# Patient Record
Sex: FEMALE | Race: WHITE
[De-identification: names, ages, dates, MRNs, and addresses within clinical notes are randomized per-mention and may not be internally consistent; named-entity substitution may affect disease eponyms.]

---

## 2017-07-03 ENCOUNTER — HOSPITAL ENCOUNTER (EMERGENCY)
Dept: HOSPITAL 62 - ER | Age: 27
LOS: 1 days | Discharge: HOME | End: 2017-07-04
Payer: SELF-PAY
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
## 2017-07-03 DIAGNOSIS — Z88.2: ICD-10-CM
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
## 2017-07-03 DIAGNOSIS — S59.902A: Primary | ICD-10-CM

## 2017-07-03 PROCEDURE — 99283 EMERGENCY DEPT VISIT LOW MDM: CPT

## 2017-07-03 NOTE — RADIOLOGY REPORT (SQ)
EXAM DESCRIPTION:  ELBOW LEFT OVER 2 VIEWS



COMPLETED DATE/TIME:  7/3/2017 11:26 pm



REASON FOR STUDY:  pain s/p injury



COMPARISON:  None.



NUMBER OF VIEWS:  Four views.



TECHNIQUE:  AP, lateral, and both oblique radiographic images acquired of the left elbow.



LIMITATIONS:  None.



FINDINGS:  MINERALIZATION: Normal.

BONES: No definite acute fracture or dislocation.  No worrisome bone lesions.

JOINT: There is prominence of both the anterior and posterior fat pads consistent with underlying elb
ow joint effusion.

SOFT TISSUES: No soft tissue swelling.  No foreign body.

OTHER: No other significant finding.



IMPRESSION:  Moderate elbow joint effusion.  No fracture identified on this study though in the setti
ng of trauma with elbow joint effusion radio occult fracture cannot be excluded most likely of the ra
dial head.



TECHNICAL DOCUMENTATION:  JOB ID:  [id number]

 2011 [employer]- All Rights Reserved

## 2017-07-04 VITALS — DIASTOLIC BLOOD PRESSURE: 70 MMHG | SYSTOLIC BLOOD PRESSURE: 133 MMHG

## 2017-07-04 NOTE — ER DOCUMENT REPORT
ED General





- General


Chief Complaint: L elbow injury


Stated Complaint: ELBOW PAIN


Time Seen by Provider: 07/04/17 00:13


Notes: 


Patient is a 26-year-old female without past medical history who presents with 

left elbow pain.  Does describe as a mild, aching, burning pain.  States that 

this pain started after she was roughhousing with some of her friend's children 

and believes she performed range of motion that caused some tendon injury or 

discomfort.  Denies any significant pain to the area unless she moves it or 

touches it.  No history of similar injury in the past.  She has not seen her 

primary care doctor regarding today's concerns.  Denies any additional injuries 

or concerns.


TRAVEL OUTSIDE OF THE [place] IN LAST 30 DAYS: No





- Related Data


Allergies/Adverse Reactions: 


 





Sulfa (Sulfonamide Antibiotics) Allergy (Verified 07/04/17 02:24)


 











Past Medical History





- General


Information source: Patient





- Social History


Smoking Status: Never Smoker


Frequency of alcohol use: None


Drug Abuse: None


Family History: Reviewed & Not Pertinent


Renal/ Medical History: Denies: Hx Peritoneal Dialysis





Review of Systems





- Review of Systems


Notes: 


Constitutional: Negative for fever.


Cardiovascular: Negative for chest pain.


Respiratory: Negative for shortness of breath.


Gastrointestinal: Negative for vomiting 


Musculoskeletal: Positive for left elbow pain


Skin: Negative for rash.


Neurological: Negative for weakness or numbness.





10 point ROS negative except as marked above and in HPI.





Physical Exam





- Vital signs


Vitals: 


 











Temp Pulse Resp BP Pulse Ox


 


 98.2 F   70   18   133/70 H  100 


 


 07/04/17 00:45  07/04/17 00:45  07/04/17 00:45  07/04/17 00:45  07/04/17 00:45











Interpretation: Hypertensive


Notes: 


PHYSICAL EXAMINATION:





GENERAL: Well-appearing, well-nourished and in no acute distress.





HEAD: Atraumatic, normocephalic.





EYES:  sclera anicteric, conjunctiva are normal.





ENT: Moist mucous membranes.





NECK: Normal range of motion





LUNGS: Normal work of breathing





HEART: 2+ radial pulses bilaterally





EXTREMITIES: Swelling to the left elbow.  No limited range of motion with full 

flexion and extension.  RMU sensory and motor distribution intact.





NEUROLOGICAL: No focal neurological deficits. Moves all extremities 

spontaneously and on command.





PSYCH: Normal mood, normal affect.





SKIN: Warm, Dry, normal turgor, no rashes or lesions noted.





Course





- Re-evaluation


Re-evalutation: 





07/04/17 00:35


Patient presents with swelling over the left elbow without any acute deformity 

or limited ROM. Focal swelling is present to the area both on exam and xray. 

Patient denies any direct blunt trauma to the area to suspect a radial head 

fracture. I have discussed this possibility with the patient based on xray read 

and she has verbalized the importance of follow-up and return precautions. 





- Vital Signs


Vital signs: 


 











Temp Pulse Resp BP Pulse Ox


 


 98.2 F   70   18   133/70 H  100 


 


 07/04/17 00:45  07/04/17 00:45  07/04/17 00:45  07/04/17 00:45  07/04/17 00:45














- Diagnostic Test


Radiology reviewed: Reports reviewed





Discharge





- Discharge


Clinical Impression: 


Injury of left elbow


Qualifiers:


 Encounter type: initial encounter Qualified Code(s): S59.902A - Unspecified 

injury of left elbow, initial encounter





Condition: Good


Disposition: HOME, SELF-CARE


Additional Instructions: 


Your x-ray does not show any acute fracture today.  You likely have a 

ligamentous strain.  You should continue to take anti-inflammatories such as 

ibuprofen 600 mg every 6 hours.  Continue to apply ice to the area is much your 

able.  Please follow-up with your primary care physician if you do not have 

improving your symptoms in the next 1-2 weeks.  Please return immediately if 

you develop weakness, numbness, spreading redness from the area, or any other 

symptoms that are concerning to you.

## 2018-09-08 ENCOUNTER — HOSPITAL ENCOUNTER (EMERGENCY)
Dept: HOSPITAL 62 - ER | Age: 28
Discharge: HOME | End: 2018-09-08
Payer: COMMERCIAL

## 2018-09-08 VITALS — SYSTOLIC BLOOD PRESSURE: 130 MMHG | DIASTOLIC BLOOD PRESSURE: 74 MMHG

## 2018-09-08 DIAGNOSIS — K02.9: Primary | ICD-10-CM

## 2018-09-08 DIAGNOSIS — K04.7: ICD-10-CM

## 2018-09-08 DIAGNOSIS — K08.89: ICD-10-CM

## 2018-09-08 DIAGNOSIS — K05.10: ICD-10-CM

## 2018-09-08 DIAGNOSIS — Z71.6: ICD-10-CM

## 2018-09-08 DIAGNOSIS — F17.210: ICD-10-CM

## 2018-09-08 DIAGNOSIS — Z88.2: ICD-10-CM

## 2018-09-08 PROCEDURE — 99282 EMERGENCY DEPT VISIT SF MDM: CPT

## 2018-09-08 PROCEDURE — 99406 BEHAV CHNG SMOKING 3-10 MIN: CPT

## 2018-09-08 NOTE — ER DOCUMENT REPORT
ED Oral Problem





- General


Chief Complaint: Mouth Problem


Stated Complaint: POSSIBLE ABSCESS


Time Seen by Provider: 09/08/18 18:45


Mode of Arrival: Ambulatory


Information source: Patient


Notes: 





28-year-old female presented ED for dental pain since Wednesday.  She states 

she has been solution and think trying to not have to come to the hospital.  

She states that the tooth has gotten worse and worse and is becoming more more 

painful.  Patient is alert and oriented respirations regular and unlabored 

speaking in full sentences walk with a even steady gait.


TRAVEL OUTSIDE OF THE [place] IN LAST 30 DAYS: No





- HPI


Patient complains to provider of: Toothache


Onset: Other - Tuesday or Wednesday


Onset: Gradual


Quality of pain: Sharp, Throbbing


Severity: Moderate


Pain Level: 4


Associated symptoms: Toothache


Worsened by: Cold


Relieved by: Nothing


Similar symptoms previously: Yes


Recently seen / treated by doctor/dentist: No





- Related Data


Allergies/Adverse Reactions: 


 





Sulfa (Sulfonamide Antibiotics) Allergy (Verified 07/04/17 02:24)


 











Past Medical History





- General


Information source: Patient





- Social History


Smoking Status: Current Every Day Smoker


Cigarette use (# per day): Yes - 1-2 cigarettes a day


Chew tobacco use (# tins/day): No


Smoking Education Provided: Yes - 4 Minutes


Frequency of alcohol use: Social


Drug Abuse: None


Occupation: Eye care


Lives with: Alone


Family History: Reviewed & Not Pertinent


Patient has suicidal ideation: No


Patient has homicidal ideation: No





- Past Medical History


Cardiac Medical History: Reports: None


Pulmonary Medical History: Reports: None


EENT Medical History: Reports: None


Neurological Medical History: Reports: None


Endocrine Medical History: Reports: None


Renal/ Medical History: Reports: Other - Colposcopy with biopsy


Malignancy Medical History: Reports: None


GI Medical History: Reports: None


Musculoskeletal Medical History: Reports None


Skin Medical History: Reports None


Psychiatric Medical History: Reports: None


Traumatic Medical History: Reports: None


Infectious Medical History: Reports: None


Past Surgical History: Reports: Hx Gynecologic Surgery - Possibly with biopsy





Review of Systems





- Review of Systems


Constitutional: No symptoms reported


EENT: Dental problem


Cardiovascular: No symptoms reported


Respiratory: No symptoms reported


Gastrointestinal: No symptoms reported


Genitourinary: No symptoms reported


Female Genitourinary: No symptoms reported


Musculoskeletal: No symptoms reported


Skin: No symptoms reported


Hematologic/Lymphatic: No symptoms reported


Neurological/Psychological: No symptoms reported





Physical Exam





- Vital signs


Vitals: 


 











Temp Pulse Resp BP Pulse Ox


 


 98.2 F   94   16   130/74 H  98 


 


 09/08/18 18:28  09/08/18 18:28  09/08/18 18:28  09/08/18 18:28  09/08/18 18:28











Interpretation: Normal





- General


General appearance: Appears well, Alert





- HEENT


Head: Normocephalic, Atraumatic


Eyes: Normal


Pupils: PERRL


Ears: Normal


External canal: Normal


Tympanic membrane: Normal


Sinus: Normal


Nasal: Normal


Mouth/Lips: Caries


Teeth diagram: 


  __________________________














  __________________________





 1 - Dental inflammation with a very small dental abscess.  Patient has 

gingivitis around the teeth #3, 4, and 5.





Pharynx: Normal


Neck: Normal





- Respiratory


Respiratory status: No respiratory distress


Chest status: Nontender


Breath sounds: Normal


Chest palpation: Normal





- Cardiovascular


Rhythm: Regular


Heart sounds: Normal auscultation


Murmur: No





- Abdominal


Inspection: Normal


Distension: No distension


Bowel sounds: Normal


Tenderness: Nontender


Organomegaly: No organomegaly





- Back


Back: Normal, Nontender





- Extremities


General upper extremity: Normal inspection, Nontender, Normal color, Normal ROM

, Normal temperature


General lower extremity: Normal inspection, Nontender, Normal color, Normal ROM

, Normal temperature, Normal weight bearing.  No: Lakhwinder's sign





- Neurological


Neuro grossly intact: Yes


Cognition: Normal


Orientation: AAOx4


Keila Coma Scale Eye Opening: Spontaneous


Phenix Coma Scale Verbal: Oriented


Keila Coma Scale Motor: Obeys Commands


Phenix Coma Scale Total: 15


Speech: Normal


Motor strength normal: LUE, RUE, LLE, RLE


Sensory: Normal





- Psychological


Associated symptoms: Normal affect, Normal mood





- Skin


Skin Temperature: Warm


Skin Moisture: Dry


Skin Color: Normal





Course





- Re-evaluation


Re-evalutation: 





09/08/18 21:29


She was treated with clindamycin and ibuprofen in the emergency room and 

discharged home with prescription for clindamycin and ibuprofen.  Patient to 

follow-up with her primary doctor and a dentist.  Presentation is most 

consistent with likely an infected tooth.  Airway is patent.  Vitals within 

normal limits.  Patient is able swallow without any difficulty.  There is no 

significant facial swelling.  No evidence of Crescencio angina, apical abscess, or 

airway obstruction.  Patient will be started on antibiotics.  I've instructed 

to follow-up with dentistry as earliest ability for definitive management.  At 

this time will discharge with return precautions and follow-up recommendations.

  Verbal discharge instructions given a the bedside and opportunity for 

questions given. Medication warnings reviewed. Patient is in agreement with 

this plan and has verbalized understanding of return precautions and the need 

for primary care follow-up in the next 24-72 hours.





- Vital Signs


Vital signs: 


 











Temp Pulse Resp BP Pulse Ox


 


 98.2 F   94   16   130/74 H  98 


 


 09/08/18 18:28  09/08/18 18:28  09/08/18 18:28  09/08/18 18:28  09/08/18 18:28














Discharge





- Discharge


Clinical Impression: 


 Pain due to dental caries





Condition: Stable


Disposition: HOME, SELF-CARE


Additional Instructions: 


TOOTHACHE:





     Your pain is due to dental decay.  The tooth must be repaired in order for 

you to feel better.  You will, therefore, be referred to a dentist.  We do not 

have dentists on the staff at [facility].


     Severe swelling or drainage around a tooth usually means a dental abscess.

  This also requires evaluation and treatment by the dentist, but antibiotics 

may be prescribed while awaiting dental treatment.


     You should be rechecked immediately if you develop major swelling of the 

face, increasing pain, a lump in the jaw or gums, headache, difficulty 

swallowing, or fever.





CLINDAMYCIN:


     You have been given a prescription for the antibiotic clindamycin.  It is 

often prescribed for infections in the mouth, such as dental infections or 

abscesses, and for skin infections due to MRSA.  It's important that you take 

all the medication, unless instructed otherwise by your physician.  Failure to 

complete the entire course can result in relapse of your condition.


     Common side effects of antibiotics include nausea, intestinal cramping, or 

diarrhea.  Women may develop vaginal yeast infections, and babies can get yeast 

(thrush) in the mouth following the use of antibiotics.  Contact your physician 

if you develop significant side effects from this medication.


     Allergy to this antibiotic can result in hives, wheezing, faintness, or 

itching.  If symptoms of allergy occur, stop the medication and call the doctor.





Ibuprofen





     Ibuprofen is an excellent, safe drug for pain control.  In addition, it 

has potent antiinflammatory effects which are beneficial, especially in the 

treatment of injuries, arthritis, or tendonitis. It's best to take ibuprofen 

with food.  Persons with ulcer disease or allergy to aspirin should notify 

their physician of this before taking ibuprofen.


     Take the medication exactly as prescribed.  Don't take additional doses 

unless instructed to do so by your doctor.  If you develop wheezing, shortness 

of breath, hives, faintness, stomach pain, vomiting, or dark black stools, 

return for re-evaluation at once.





FOLLOW-UP CARE:


     You have been referred for follow-up care to the dentists listed below.  

Call the dentists office for an appointment as you were instructed or within 

the next two days.  If you experience worsening or a significant change in your 

symptoms, notify the physician immediately or return to the Emergency 

Department at any time for re-evaluation.





[facility]


[address] [place], [place]


[phone]








[facility]


[address] [place] [address] [phone]





[facility]


[address]


[phone]





[facility]


[address]


[phone]





[facility]


[address]


[phone]


[email or web address]





[facility]


[address] [place]  [address] [phone]


Monday-Thursdays  8:00am to 5:00 pm


Will see patients from other [facility]


Charges based on income and family size and


accepts Medicare, Medicaid, and Insurances


Will pull molars





[facility]


Student Clinics


[facility] [address] [phone]


Hours of Operation


   8:00 am - 4:30 pm weekdays





The following dental offices accept Medicaid:





   [facility]   [phone]


   Dr. Farley         [phone]


   Dr. Banerjee         [phone]


   Dr. Levi         [phone]


   Dr. Thomas         [phone]


   [facility]


      oral surgery      [phone]


   Dr. Canela ([place])      [phone]


   Dr. Stanton ([place])   [phone]


   [facility]      [phone]


   [facility] ([place])   [phone]


   Dr. Nguyen ([place])      [phone]


   [facility]      [phone]


   [facility]   [phone]


   [facility]   [phone]


   Dr. Cardozo ([place])      [phone]


   [facility] 


      ([place])      [phone]





   Medicaid Care Line      [phone]


Prescriptions: 


Ibuprofen 600 mg PO Q6HP PRN #20 tablet


 PRN Reason: 


Clindamycin HCl 300 mg PO Q6 #40 capsule


Forms:  Elevated Blood Pressure, Smoking Cessation Education